# Patient Record
Sex: FEMALE | Race: WHITE | NOT HISPANIC OR LATINO | Employment: FULL TIME | URBAN - METROPOLITAN AREA
[De-identification: names, ages, dates, MRNs, and addresses within clinical notes are randomized per-mention and may not be internally consistent; named-entity substitution may affect disease eponyms.]

---

## 2017-01-04 ENCOUNTER — HOSPITAL ENCOUNTER (OUTPATIENT)
Facility: HOSPITAL | Age: 27
Setting detail: OBSERVATION
Discharge: HOME | End: 2017-01-05
Attending: OBSTETRICS & GYNECOLOGY | Admitting: OBSTETRICS & GYNECOLOGY

## 2017-01-04 ENCOUNTER — LAB REQUISITION (OUTPATIENT)
Dept: LAB | Facility: HOSPITAL | Age: 27
End: 2017-01-04

## 2017-01-04 DIAGNOSIS — O60.03 PRETERM LABOR IN THIRD TRIMESTER WITHOUT DELIVERY: Primary | ICD-10-CM

## 2017-01-04 DIAGNOSIS — O60.03 PRETERM LABOR IN THIRD TRIMESTER WITHOUT DELIVERY: ICD-10-CM

## 2017-01-04 DIAGNOSIS — Z34.83 ENCOUNTER FOR SUPERVISION OF OTHER NORMAL PREGNANCY, THIRD TRIMESTER: ICD-10-CM

## 2017-01-04 DIAGNOSIS — Z36.9 ENCOUNTER FOR ANTENATAL SCREENING OF MOTHER: ICD-10-CM

## 2017-01-04 PROBLEM — Z3A.35 35 WEEKS GESTATION OF PREGNANCY: Status: ACTIVE | Noted: 2017-01-04

## 2017-01-04 PROBLEM — O24.419 GESTATIONAL DIABETES MELLITUS (GDM) IN THIRD TRIMESTER: Status: ACTIVE | Noted: 2017-01-04

## 2017-01-04 LAB
ABO GROUP BLD: NORMAL
ALBUMIN SERPL-MCNC: 3.8 G/DL (ref 3.2–4.8)
ALBUMIN/GLOB SERPL: 1.3 G/DL (ref 1.5–2.5)
ALP SERPL-CCNC: 152 U/L (ref 25–100)
ALT SERPL W P-5'-P-CCNC: 14 U/L (ref 7–40)
AMPHET+METHAMPHET UR QL: NEGATIVE
AMPHETAMINES UR QL: NEGATIVE
ANION GAP SERPL CALCULATED.3IONS-SCNC: 9 MMOL/L (ref 3–11)
AST SERPL-CCNC: 18 U/L (ref 0–33)
BACTERIA UR QL AUTO: ABNORMAL /HPF
BARBITURATES UR QL SCN: NEGATIVE
BASOPHILS # BLD AUTO: 0.02 10*3/MM3 (ref 0–0.2)
BASOPHILS NFR BLD AUTO: 0.1 % (ref 0–1)
BENZODIAZ UR QL SCN: NEGATIVE
BILIRUB SERPL-MCNC: 0.2 MG/DL (ref 0.3–1.2)
BILIRUB UR QL STRIP: NEGATIVE
BLD GP AB SCN SERPL QL: NEGATIVE
BUN BLD-MCNC: 6 MG/DL (ref 9–23)
BUN/CREAT SERPL: 10 (ref 7–25)
BUPRENORPHINE SERPL-MCNC: NEGATIVE NG/ML
CALCIUM SPEC-SCNC: 9.5 MG/DL (ref 8.7–10.4)
CANNABINOIDS SERPL QL: NEGATIVE
CHLORIDE SERPL-SCNC: 105 MMOL/L (ref 99–109)
CLARITY UR: ABNORMAL
CO2 SERPL-SCNC: 23 MMOL/L (ref 20–31)
COCAINE UR QL: NEGATIVE
COLOR UR: YELLOW
CREAT BLD-MCNC: 0.6 MG/DL (ref 0.6–1.3)
DEPRECATED RDW RBC AUTO: 43.2 FL (ref 37–54)
EOSINOPHIL # BLD AUTO: 0.26 10*3/MM3 (ref 0.1–0.3)
EOSINOPHIL NFR BLD AUTO: 1.4 % (ref 0–3)
ERYTHROCYTE [DISTWIDTH] IN BLOOD BY AUTOMATED COUNT: 13.5 % (ref 11.3–14.5)
GFR SERPL CREATININE-BSD FRML MDRD: 122 ML/MIN/1.73
GLOBULIN UR ELPH-MCNC: 3 GM/DL
GLUCOSE BLD-MCNC: 123 MG/DL (ref 70–100)
GLUCOSE UR STRIP-MCNC: ABNORMAL MG/DL
HCT VFR BLD AUTO: 34.9 % (ref 34.5–44)
HGB BLD-MCNC: 12 G/DL (ref 11.5–15.5)
HGB UR QL STRIP.AUTO: NEGATIVE
HYALINE CASTS UR QL AUTO: ABNORMAL /LPF
IMM GRANULOCYTES # BLD: 0.14 10*3/MM3 (ref 0–0.03)
IMM GRANULOCYTES NFR BLD: 0.8 % (ref 0–0.6)
KETONES UR QL STRIP: NEGATIVE
LEUKOCYTE ESTERASE UR QL STRIP.AUTO: ABNORMAL
LYMPHOCYTES # BLD AUTO: 2.33 10*3/MM3 (ref 0.6–4.8)
LYMPHOCYTES NFR BLD AUTO: 12.8 % (ref 24–44)
MCH RBC QN AUTO: 30.2 PG (ref 27–31)
MCHC RBC AUTO-ENTMCNC: 34.4 G/DL (ref 32–36)
MCV RBC AUTO: 87.9 FL (ref 80–99)
METHADONE UR QL SCN: NEGATIVE
MONOCYTES # BLD AUTO: 1.19 10*3/MM3 (ref 0–1)
MONOCYTES NFR BLD AUTO: 6.5 % (ref 0–12)
NEUTROPHILS # BLD AUTO: 14.25 10*3/MM3 (ref 1.5–8.3)
NEUTROPHILS NFR BLD AUTO: 78.4 % (ref 41–71)
NITRITE UR QL STRIP: NEGATIVE
OPIATES UR QL: NEGATIVE
OXYCODONE UR QL SCN: POSITIVE
PCP UR QL SCN: NEGATIVE
PH UR STRIP.AUTO: 6 [PH] (ref 5–8)
PLATELET # BLD AUTO: 300 10*3/MM3 (ref 150–450)
PMV BLD AUTO: 9.9 FL (ref 6–12)
POTASSIUM BLD-SCNC: 3.8 MMOL/L (ref 3.5–5.5)
PROPOXYPH UR QL: NEGATIVE
PROT SERPL-MCNC: 6.8 G/DL (ref 5.7–8.2)
PROT UR QL STRIP: NEGATIVE
RBC # BLD AUTO: 3.97 10*6/MM3 (ref 3.89–5.14)
RBC # UR: ABNORMAL /HPF
REF LAB TEST METHOD: ABNORMAL
RH BLD: POSITIVE
SODIUM BLD-SCNC: 137 MMOL/L (ref 132–146)
SP GR UR STRIP: 1.02 (ref 1–1.03)
SQUAMOUS #/AREA URNS HPF: ABNORMAL /HPF
TRICYCLICS UR QL SCN: NEGATIVE
UROBILINOGEN UR QL STRIP: ABNORMAL
WBC NRBC COR # BLD: 18.19 10*3/MM3 (ref 3.5–10.8)
WBC UR QL AUTO: ABNORMAL /HPF

## 2017-01-04 PROCEDURE — 96376 TX/PRO/DX INJ SAME DRUG ADON: CPT

## 2017-01-04 PROCEDURE — 80053 COMPREHEN METABOLIC PANEL: CPT | Performed by: NURSE PRACTITIONER

## 2017-01-04 PROCEDURE — G0378 HOSPITAL OBSERVATION PER HR: HCPCS

## 2017-01-04 PROCEDURE — 96372 THER/PROPH/DIAG INJ SC/IM: CPT

## 2017-01-04 PROCEDURE — 87081 CULTURE SCREEN ONLY: CPT | Performed by: OBSTETRICS & GYNECOLOGY

## 2017-01-04 PROCEDURE — 59025 FETAL NON-STRESS TEST: CPT

## 2017-01-04 PROCEDURE — 86901 BLOOD TYPING SEROLOGIC RH(D): CPT

## 2017-01-04 PROCEDURE — G0480 DRUG TEST DEF 1-7 CLASSES: HCPCS | Performed by: NURSE PRACTITIONER

## 2017-01-04 PROCEDURE — 25010000002 PENICILLIN G POTASSIUM PER 600000 UNITS: Performed by: OBSTETRICS & GYNECOLOGY

## 2017-01-04 PROCEDURE — 80306 DRUG TEST PRSMV INSTRMNT: CPT | Performed by: NURSE PRACTITIONER

## 2017-01-04 PROCEDURE — 86900 BLOOD TYPING SEROLOGIC ABO: CPT

## 2017-01-04 PROCEDURE — 96361 HYDRATE IV INFUSION ADD-ON: CPT

## 2017-01-04 PROCEDURE — 25010000002 PROMETHAZINE PER 50 MG: Performed by: OBSTETRICS & GYNECOLOGY

## 2017-01-04 PROCEDURE — 25010000002 MORPHINE (PF) 10 MG/ML SOLUTION: Performed by: OBSTETRICS & GYNECOLOGY

## 2017-01-04 PROCEDURE — 96365 THER/PROPH/DIAG IV INF INIT: CPT

## 2017-01-04 PROCEDURE — 81001 URINALYSIS AUTO W/SCOPE: CPT | Performed by: NURSE PRACTITIONER

## 2017-01-04 PROCEDURE — 87086 URINE CULTURE/COLONY COUNT: CPT | Performed by: NURSE PRACTITIONER

## 2017-01-04 PROCEDURE — 25010000002 BUTORPHANOL PER 1 MG: Performed by: OBSTETRICS & GYNECOLOGY

## 2017-01-04 PROCEDURE — 87147 CULTURE TYPE IMMUNOLOGIC: CPT | Performed by: OBSTETRICS & GYNECOLOGY

## 2017-01-04 PROCEDURE — 85025 COMPLETE CBC W/AUTO DIFF WBC: CPT | Performed by: NURSE PRACTITIONER

## 2017-01-04 PROCEDURE — 86850 RBC ANTIBODY SCREEN: CPT

## 2017-01-04 RX ORDER — SODIUM CHLORIDE 0.9 % (FLUSH) 0.9 %
1-10 SYRINGE (ML) INJECTION AS NEEDED
Status: DISCONTINUED | OUTPATIENT
Start: 2017-01-04 | End: 2017-01-05 | Stop reason: HOSPADM

## 2017-01-04 RX ORDER — PROMETHAZINE HYDROCHLORIDE 25 MG/ML
12.5 INJECTION, SOLUTION INTRAMUSCULAR; INTRAVENOUS EVERY 6 HOURS PRN
Status: DISCONTINUED | OUTPATIENT
Start: 2017-01-04 | End: 2017-01-05 | Stop reason: HOSPADM

## 2017-01-04 RX ORDER — LIDOCAINE HYDROCHLORIDE 10 MG/ML
5 INJECTION, SOLUTION INFILTRATION; PERINEURAL AS NEEDED
Status: DISCONTINUED | OUTPATIENT
Start: 2017-01-04 | End: 2017-01-05 | Stop reason: HOSPADM

## 2017-01-04 RX ORDER — SODIUM CHLORIDE, SODIUM LACTATE, POTASSIUM CHLORIDE, CALCIUM CHLORIDE 600; 310; 30; 20 MG/100ML; MG/100ML; MG/100ML; MG/100ML
125 INJECTION, SOLUTION INTRAVENOUS CONTINUOUS
Status: DISCONTINUED | OUTPATIENT
Start: 2017-01-04 | End: 2017-01-05 | Stop reason: HOSPADM

## 2017-01-04 RX ORDER — MORPHINE SULFATE 10 MG/ML
15 INJECTION, SOLUTION INTRAMUSCULAR; INTRAVENOUS EVERY 4 HOURS PRN
Status: COMPLETED | OUTPATIENT
Start: 2017-01-04 | End: 2017-01-04

## 2017-01-04 RX ORDER — LIDOCAINE HYDROCHLORIDE 10 MG/ML
5 INJECTION, SOLUTION INFILTRATION; PERINEURAL AS NEEDED
Status: CANCELLED | OUTPATIENT
Start: 2017-01-04

## 2017-01-04 RX ORDER — SODIUM CHLORIDE 0.9 % (FLUSH) 0.9 %
1-10 SYRINGE (ML) INJECTION AS NEEDED
Status: CANCELLED | OUTPATIENT
Start: 2017-01-04

## 2017-01-04 RX ORDER — SODIUM CHLORIDE, SODIUM LACTATE, POTASSIUM CHLORIDE, CALCIUM CHLORIDE 600; 310; 30; 20 MG/100ML; MG/100ML; MG/100ML; MG/100ML
125 INJECTION, SOLUTION INTRAVENOUS CONTINUOUS
Status: CANCELLED | OUTPATIENT
Start: 2017-01-04

## 2017-01-04 RX ORDER — HYDROXYZINE PAMOATE 25 MG/1
25 CAPSULE ORAL NIGHTLY PRN
COMMUNITY
End: 2017-06-01

## 2017-01-04 RX ORDER — ACETAMINOPHEN 325 MG/1
650 TABLET ORAL EVERY 4 HOURS PRN
Status: CANCELLED | OUTPATIENT
Start: 2017-01-04

## 2017-01-04 RX ORDER — ACETAMINOPHEN 325 MG/1
650 TABLET ORAL EVERY 4 HOURS PRN
Status: DISCONTINUED | OUTPATIENT
Start: 2017-01-04 | End: 2017-01-05 | Stop reason: HOSPADM

## 2017-01-04 RX ORDER — MORPHINE SULFATE 4 MG/ML
15 INJECTION, SOLUTION INTRAMUSCULAR; INTRAVENOUS EVERY 4 HOURS PRN
Status: DISCONTINUED | OUTPATIENT
Start: 2017-01-04 | End: 2017-01-04 | Stop reason: SDUPTHER

## 2017-01-04 RX ADMIN — SODIUM CHLORIDE, POTASSIUM CHLORIDE, SODIUM LACTATE AND CALCIUM CHLORIDE 125 ML/HR: 600; 310; 30; 20 INJECTION, SOLUTION INTRAVENOUS at 15:05

## 2017-01-04 RX ADMIN — SODIUM CHLORIDE, POTASSIUM CHLORIDE, SODIUM LACTATE AND CALCIUM CHLORIDE 1000 ML: 600; 310; 30; 20 INJECTION, SOLUTION INTRAVENOUS at 14:40

## 2017-01-04 RX ADMIN — SODIUM CHLORIDE 2.5 MILLION UNITS: 9 INJECTION, SOLUTION INTRAVENOUS at 21:55

## 2017-01-04 RX ADMIN — PENICILLIN G POTASSIUM 5 MILLION UNITS: 5000000 POWDER, FOR SOLUTION INTRAMUSCULAR; INTRAPLEURAL; INTRATHECAL; INTRAVENOUS at 15:33

## 2017-01-04 RX ADMIN — BUTORPHANOL TARTRATE 2 MG: 2 INJECTION, SOLUTION INTRAMUSCULAR; INTRAVENOUS at 15:35

## 2017-01-04 RX ADMIN — PROMETHAZINE HYDROCHLORIDE 12.5 MG: 25 INJECTION INTRAMUSCULAR; INTRAVENOUS at 21:45

## 2017-01-04 RX ADMIN — MORPHINE SULFATE 15 MG: 10 INJECTION, SOLUTION INTRAMUSCULAR; INTRAVENOUS at 21:55

## 2017-01-04 NOTE — IP AVS SNAPSHOT
AFTER VISIT SUMMARY             Rachana Balderas           About your hospitalization     You were admitted on:  January 4, 2017 You last received care in the:  [facility] LABOR DELIVERY       Procedures & Surgeries         Medications    If you or your caregiver advised us that you are currently taking a medication and that medication is marked below as “Resume”, this simply indicates that we have reviewed those medications to make sure our new therapy recommendations do not interfere.  If you have concerns about medications other than those new ones which we are prescribing today, please consult the physician who prescribed them (or your primary physician).  Our review of your home medications is not meant to indicate that we are directing their use.             Your Medications      CONTINUE taking these medications     azithromycin 250 MG tablet   Commonly known as:  ZITHROMAX           diphenhydrAMINE 25 mg capsule   Take 1 capsule by mouth Every 6 (Six) Hours As Needed for itching.   Commonly known as:  BENADRYL           ferrous sulfate 325 (65 FE) MG tablet   TAKE 1 TABLET (325 MG) BY ORAL ROUTE ONCE DAILY FOR 30 DAYS           hydrOXYzine 25 MG capsule   Take 25 mg by mouth At Night As Needed for itching.   Commonly known as:  VISTARIL           loratadine 10 MG tablet   Take 1 tablet by mouth Daily.   Commonly known as:  CLARITIN           raNITIdine 150 MG tablet   TAKE 1 TABLET TWICE A DAY   Commonly known as:  ZANTAC                      Your Medications      Your Medication List           Morning Noon Evening Bedtime As Needed    azithromycin 250 MG tablet   Commonly known as:  ZITHROMAX                                diphenhydrAMINE 25 mg capsule   Take 1 capsule by mouth Every 6 (Six) Hours As Needed for itching.   Commonly known as:  BENADRYL                                ferrous sulfate 325 (65 FE) MG tablet   TAKE 1 TABLET (325 MG) BY ORAL ROUTE ONCE DAILY FOR 30 DAYS                                   hydrOXYzine 25 MG capsule   Take 25 mg by mouth At Night As Needed for itching.   Commonly known as:  VISTARIL                                loratadine 10 MG tablet   Take 1 tablet by mouth Daily.   Commonly known as:  CLARITIN                                raNITIdine 150 MG tablet   TAKE 1 TABLET TWICE A DAY   Commonly known as:  ZANTAC                                         Instructions for After Discharge        Discharge References/Attachments      LABOR INFORMATION, EASY-TO-READ (ENGLISH)       Follow-ups for After Discharge        Follow-up Information     Follow up with Navya Hinojosa MD Follow up on 1/10/2017.    Specialties:  Obstetrics and Gynecology, Gynecology    Contact information:    [address] [facility] [address] [place] [address]  [phone]        [employer] Signup     Our records indicate that you have declined [employer] signup. If you would like to sign up for , please email [email or web address] or call [phone] to obtain an activation code.         Summary of Your Hospitalization        Reason for Hospitalization     Your primary diagnosis was:  Not on File    Your diagnoses also included:  35 Weeks Gestation Of Pregnancy,  Labor In Third Trimester, Gestational Diabetes Mellitus (Gdm) In Third Trimester, Threatened Premature Labor      Care Providers     Provider Service Role Specialty    Navya Hinojosa MD -- Attending Provider Obstetrics and Gynecology      Your Allergies  Date Reviewed: 2017    No active allergies      Pending Labs     Order Current Status    Oxycodone / Morphone Confirmation, Urine In process    Group B Strep Culture Preliminary result    Urine Culture Preliminary result      Patient Belongings Returned     Document Return of Belongings Flowsheet     Were the patient bedside belongings sent home?   --   Belongings Retrieved from Security & Sent Home   --    Belongings Sent to Safe   --   Medications  Retrieved from Pharmacy & Sent Home   --              More Information       Labor Information   labor is when labor starts before you are 37 weeks pregnant. The normal length of pregnancy is 39 to 41 weeks.   CAUSES   The cause of  labor is not often known. The most common known cause is infection.  RISK FACTORS  · Having a history of  labor.  · Having your water break before it should.  · Having a placenta that covers the opening of the cervix.  · Having a placenta that breaks away from the uterus.  · Having a cervix that is too weak to hold the baby in the uterus.  · Having too much fluid in the amniotic sac.  · Taking drugs or smoking while pregnant.  · Not gaining enough weight while pregnant.  · Being younger than 18 and older than 35 years old.  · Having a low income.  · Being .  SYMPTOMS  · Period-like cramps, belly (abdominal) pain, or back pain.  · Contractions that are regular, as often as six in an hour. They may be mild or painful.  · Contractions that start at the top of the belly. They then move to the lower belly and back.  · Lower belly pressure that seems to get stronger.  · Bleeding from the vagina.  · Fluid leaking from the vagina.  TREATMENT   Treatment depends on:  · Your condition.  · The condition of your baby.  · How many weeks pregnant you are.  Your doctor may have you:  · Take medicine to stop contractions.  · Stay in bed except to use the restroom (bed rest).  · Stay in the hospital.  WHAT SHOULD YOU DO IF YOU THINK YOU ARE IN  LABOR?  Call your doctor right away. You need to go to the hospital right away.   HOW CAN YOU PREVENT  LABOR IN FUTURE PREGNANCIES?  · Stop smoking, if you smoke.  · Maintain healthy weight gain.  · Do not take drugs or be around chemicals that are not needed.  · Tell your doctor if you think you have an infection.  · Tell your doctor if you had a  labor before.     This information is not intended  to replace advice given to you by your health care provider. Make sure you discuss any questions you have with your health care provider.     Document Released: 03/16/2010 Document Revised: 05/03/2016 Document Reviewed: 01/20/2014  [employer] Interactive Patient Education ©2016 [employer]            SYMPTOMS OF A STROKE    Call 911 or have someone take you to the Emergency Department if you have any of the following:    · Sudden numbness or weakness of your face, arm or leg especially on one side of the body  · Sudden confusion, diffiiculty speaking or trouble understanding   · Changes in your vision or loss of sight in one eye  · Sudden severe headache with no known cause  · sudden dizziness, trouble walking, loss of balance or coordination    It is important to seek emergency care right away if you have further stroke symptoms. If you get emergency help quickly, the powerful clot-dissolving medicines can reduce the disabilities caused by a stroke.     For more information:    American Stroke Association  6-802-5-STROKE  www.strokeassociation.org           IF YOU SMOKE OR USE TOBACCO PLEASE READ THE FOLLOWING:    Why is smoking bad for me?  Smoking increases the risk of heart disease, lung disease, vascular disease, stroke, and cancer.     If you smoke, STOP!    If you would like more information on quitting smoking, please visit the HereOrThere website: [email or web address]   This link will provide additional resources including the QUIT line and the Beat the Pack support groups.     For more information:    American Cancer Society  ([phone] 571-5398    American Heart Association  2-648-598-1837               YOU ARE THE MOST IMPORTANT FACTOR IN YOUR RECOVERY.     Follow all instructions carefully.     I have reviewed my discharge instructions with my nurse, including the following information, if applicable:     Information about my illness and diagnosis   Follow up  appointments (including lab draws)   Wound Care   Equipment Needs   Medications (new and continuing) along with side effects   Preventative information such as vaccines and smoking cessations   Diet   Pain   I know when to contact my Doctor's office or seek emergency care      I want my nurse to describe the side effects of my medications: YES NO   If the answer is no, I understand the side effects of my medications: YES NO   My nurse described the side effects of my medications in a way that I could understand: YES NO   I have taken my personal belongings and my own medications with me at discharge: YES NO            I have received this information and my questions have been answered. I have discussed any concerns I see with this plan with the nurse or physician. I understand these instructions.    Signature of Patient or Responsible Person: _____________________________________    Date: _________________  Time: __________________    Signature of Healthcare Provider: _______________________________________  Date: _________________  Time: __________________

## 2017-01-04 NOTE — LETTER
January 5, 2017     Patient: Rachana Balderas   YOB: 1990   Date of Visit: 1/4/2017       To Whom It May Concern:    It is my medical opinion that Rachana Balderas needs to be off work until further notice.           Sincerely,        LILIBETH Hinojosa  CC: No Recipients

## 2017-01-05 VITALS
HEIGHT: 70 IN | SYSTOLIC BLOOD PRESSURE: 139 MMHG | HEART RATE: 75 BPM | TEMPERATURE: 97.8 F | RESPIRATION RATE: 16 BRPM | WEIGHT: 219 LBS | DIASTOLIC BLOOD PRESSURE: 81 MMHG | BODY MASS INDEX: 31.35 KG/M2

## 2017-01-05 PROBLEM — O24.419 GESTATIONAL DIABETES MELLITUS (GDM) IN THIRD TRIMESTER: Status: RESOLVED | Noted: 2017-01-04 | Resolved: 2017-01-05

## 2017-01-05 PROBLEM — Z3A.35 35 WEEKS GESTATION OF PREGNANCY: Status: RESOLVED | Noted: 2017-01-04 | Resolved: 2017-01-05

## 2017-01-05 PROBLEM — O60.03 PRETERM LABOR IN THIRD TRIMESTER WITHOUT DELIVERY: Status: RESOLVED | Noted: 2017-01-04 | Resolved: 2017-01-05

## 2017-01-05 PROBLEM — O60.03 PRETERM LABOR IN THIRD TRIMESTER: Status: RESOLVED | Noted: 2017-01-04 | Resolved: 2017-01-05

## 2017-01-05 LAB
GLUCOSE BLDC GLUCOMTR-MCNC: 86 MG/DL (ref 70–130)
REF LAB TEST METHOD: NORMAL

## 2017-01-05 PROCEDURE — 25010000002 PENICILLIN G POTASSIUM PER 600000 UNITS: Performed by: OBSTETRICS & GYNECOLOGY

## 2017-01-05 PROCEDURE — 82962 GLUCOSE BLOOD TEST: CPT

## 2017-01-05 PROCEDURE — 96361 HYDRATE IV INFUSION ADD-ON: CPT

## 2017-01-05 PROCEDURE — G0378 HOSPITAL OBSERVATION PER HR: HCPCS

## 2017-01-05 PROCEDURE — 59025 FETAL NON-STRESS TEST: CPT

## 2017-01-05 RX ADMIN — SODIUM CHLORIDE 2.5 MILLION UNITS: 9 INJECTION, SOLUTION INTRAVENOUS at 02:22

## 2017-01-05 NOTE — DISCHARGE SUMMARY
DARELL DUQUE  Patient Name: Rachana Balderas  : 1990  MRN: [id number]  Date of Service: 2017  Referring Provider: Navya Hinojosa     ID: 26 y.o.  at 36w0d    Admission Diagnosis:  labor in third trimester without delivery [O60.03]    Discharge Diagnosis:   Patient Active Problem List   Diagnosis   (none) - all problems resolved or deleted       Date of Admission: 2017  1:49 PM    Date of Discharge: 2017    Discharge Condition: Stable    Discharge to: Home    Discharge Medications:    Rachana Balderas   Home Medication Instructions JOSEF:[id number]    Printed on:17 0827   Medication Information                      azithromycin (ZITHROMAX) 250 MG tablet               diphenhydrAMINE (BENADRYL) 25 mg capsule  Take 1 capsule by mouth Every 6 (Six) Hours As Needed for itching.             ferrous sulfate 325 (65 FE) MG tablet  TAKE 1 TABLET (325 MG) BY ORAL ROUTE ONCE DAILY FOR 30 DAYS             hydrOXYzine (VISTARIL) 25 MG capsule  Take 25 mg by mouth At Night As Needed for itching.             loratadine (CLARITIN) 10 MG tablet  Take 1 tablet by mouth Daily.             raNITIdine (ZANTAC) 150 MG tablet  TAKE 1 TABLET TWICE A DAY                 Discharge Diet:     Discharge Activity:    Follow up appointments:     Hospital summary:      Rachana was admitted for <principal problem not specified>.    She did not receive a course of  corticosteroids during her admission.      She did not receive magnesium sulfate during her admission.      Her obstetric ultrasounds and fetal testing were reassuring during her admission.  Her condition was determined to be appropriate for outpatient management and she was discharged home in stable condition.  She was instructed to follow up in 1 week with [unfilled] in  1 week.    Navya Hinojosa MD  8:27 AM

## 2017-01-05 NOTE — PROGRESS NOTES
cx remains 5/90/-1 with intact membranes overnite with no change. Baby looks reactive with nl BP so will send home as there is no real labor and she is 36 weeks

## 2017-01-05 NOTE — H&P
"History and Physical  [facility]    Chief Complaint   Patient presents with   • Laboring       Patient Active Problem List   Diagnosis   • Rash and nonspecific skin eruption   • 35 weeks gestation of pregnancy   •  labor in third trimester   • Gestational diabetes mellitus (GDM) in third trimester       Rachana Balderas is a 26 y.o. year old  with an Estimated Date of Delivery: 17 currently at 35w6d presenting with irregular contractions and was sent from [place] office in probable labor.  No bleeding or SROM. .    Prenatal care has been with Dr. Hinojosa.  It has been significant for diabetes (GDM A1).    No Additional Complaints Reported    The following portions of the patient's history were reviewed and updated as appropriate:vital signs, allergies, current medications, past medical history, past social history, past surgical history and problem list.    Review of Systems  Pertinent items are noted in HPI.     Objective     Visit Vitals   • /84 (BP Location: Left arm, Patient Position: Sitting)   • Pulse 109   • Temp 97.8 °F (36.6 °C) (Oral)   • Resp 18   • Ht 70\" (177.8 cm)   • Wt 219 lb (99.3 kg)   • LMP 04/15/2016 (Approximate)   • Breastfeeding No   • BMI 31.42 kg/m2       Physical Exam    General:  well developed; well nourished  no acute distress           Abdomen: soft, non-tender; no masses  no umbilical or inginual hernias are present  fundus firm and non-tender       FHT's: reactive and category 1   Cervix: 4-5 cm dilated, 90 effaced, 0 station   [place]: Contraction are now q 15 min     Lab Review   Labs: CBC   Lab Results (last 24 hours)     Procedure Component Value Units Date/Time    Urine Culture [[id number]] Collected:  17 1400    Specimen:  Urine from Urine, Clean Catch Updated:  17 1503    CBC & Differential [[id number]] Collected:  17 1440    Specimen:  Blood Updated:  17 1505    Narrative:       The following orders were created for panel " order CBC & Differential.  Procedure                               Abnormality         Status                     ---------                               -----------         ------                     CBC Auto Differential[[id number]]         Abnormal            Final result                 Please view results for these tests on the individual orders.    CBC Auto Differential [[id number]]  (Abnormal) Collected:  01/04/17 1440    Specimen:  Blood Updated:  01/04/17 1505     WBC 18.19 (H) 10*3/mm3      RBC 3.97 10*6/mm3      Hemoglobin 12.0 g/dL      Hematocrit 34.9 %      MCV 87.9 fL      MCH 30.2 pg      MCHC 34.4 g/dL      RDW 13.5 %      RDW-SD 43.2 fl      MPV 9.9 fL      Platelets 300 10*3/mm3      Neutrophil % 78.4 (H) %      Lymphocyte % 12.8 (L) %      Monocyte % 6.5 %      Eosinophil % 1.4 %      Basophil % 0.1 %      Immature Grans % 0.8 (H) %      Neutrophils, Absolute 14.25 (H) 10*3/mm3      Lymphocytes, Absolute 2.33 10*3/mm3      Monocytes, Absolute 1.19 (H) 10*3/mm3      Eosinophils, Absolute 0.26 10*3/mm3      Basophils, Absolute 0.02 10*3/mm3      Immature Grans, Absolute 0.14 (H) 10*3/mm3     Oxycodone / Morphone Confirmation, Urine [[id number]] Collected:  01/04/17 1400    Specimen:  Urine from Urine, Clean Catch Updated:  01/04/17 1512    Urinalysis With / Culture If Indicated [[id number]]  (Abnormal) Collected:  01/04/17 1400    Specimen:  Urine Updated:  01/04/17 1526     Color, UA Yellow      Appearance, UA Turbid (A)      pH, UA 6.0      Specific Gravity, UA 1.019      Glucose,  mg/dL (2+) (A)      Ketones, UA Negative      Bilirubin, UA Negative      Blood, UA Negative      Protein, UA Negative      Leuk Esterase, UA Moderate (2+) (A)      Nitrite, UA Negative      Urobilinogen, UA 0.2 E.U./dL     Urine Drug Screen [[id number]]  (Abnormal) Collected:  01/04/17 1400    Specimen:  Urine Updated:  01/04/17 1526     THC, Screen, Urine Negative      Phencyclidine (PCP), Urine Negative      Cocaine  Screen, Urine Negative      Methamphetamine, Urine Negative      Opiate Screen Negative      Amphetamine Screen, Urine Negative      Benzodiazepine Screen, Urine Negative      Tricyclic Antidepressants Screen Negative      Methadone Screen, Urine Negative      Barbiturates Screen, Urine Negative      Oxycodone Screen, Urine Positive (A)      Propoxyphene Screen Negative      Buprenorphine, Screen, Urine Negative     Narrative:       Cutoff For Drugs Screened:    Amphetamines               500 ng/ml  Barbiturates               200 ng/ml  Benzodiazepines            150 ng/ml  Cocaine                    150 ng/ml  Methadone                  200 ng/ml  Opiates                    100 ng/ml  Phencyclidine               25 ng/ml  THC                            50 ng/ml  Methamphetamine            500 ng/ml  Tricyclic Antidepressants  300 ng/ml  Oxycodone                  100 ng/ml  Propoxyphene               300 ng/ml  Buprenorphine               10 ng/ml    The normal value for all drugs tested is negative. This report includes unconfirmed screening results, with the cutoff values listed, to be used for medical treatment purposes only.  Unconfirmed results must not be used for non-medical purposes such as employment or legal testing.  Clinical consideration should be applied to any drug of abuse test, particularly when unconfirmed results are used.      Urinalysis, Microscopic Only [[id number]]  (Abnormal) Collected:  01/04/17 1400    Specimen:  Urine from Urine, Clean Catch Updated:  01/04/17 1526     RBC, UA 0-2 /HPF      WBC, UA 13-20 (A) /HPF      Bacteria, UA None Seen /HPF      Squamous Epithelial Cells, UA 3-6 (A) /HPF      Hyaline Casts, UA 0-6 /LPF      Methodology Automated Microscopy     Comprehensive Metabolic Panel [[id number]]  (Abnormal) Collected:  01/04/17 1440    Specimen:  Blood Updated:  01/04/17 1537     Glucose 123 (H) mg/dL      BUN 6 (L) mg/dL      Creatinine 0.60 mg/dL      Sodium 137 mmol/L       Potassium 3.8 mmol/L      Chloride 105 mmol/L      CO2 23.0 mmol/L      Calcium 9.5 mg/dL      Total Protein 6.8 g/dL      Albumin 3.80 g/dL      ALT (SGPT) 14 U/L      AST (SGOT) 18 U/L      Alkaline Phosphatase 152 (H) U/L      Total Bilirubin 0.2 (L) mg/dL      eGFR Non African Amer 122 mL/min/1.73      Globulin 3.0 gm/dL      A/G Ratio 1.3 (L) g/dL      BUN/Creatinine Ratio 10.0      Anion Gap 9.0 mmol/L     Narrative:       National Kidney Foundation Guidelines    Stage                           Description                             GFR                      1                               Normal or High                          90+  2                               Mild decrease                            60-89  3                               Moderate decrease                   30-59  4                               Severe decrease                       15-29  5                               Kidney failure                             <15    Group B Strep Culture [[id number]] Collected:  17 1445    Specimen:  Swab from Vagina Updated:  17 4654          Imaging   No data reviewed   Imaging Results (most recent)     None        Assessment/Plan     ASSESSMENT  1. IUP at 35w6d  Active Problems:    35 weeks gestation of pregnancy     labor in third trimester    Gestational diabetes mellitus (GDM) in third trimester              7 .  Possible UTI; borderline CCUA.  C&S sent.               8.   Advanced cervical dilation. Contractions now spaced out.               9.    H/o positive UDS for Oxycodone.   PLAN     Observe for further dilation.  Too early for augmentation or AROM unless making cervical change.   Too late gestation for administration of steroids.   Watch FSBGs; late evaluation.  Check Hgb Aic.   Oliver Champion MD  20177:26 PM

## 2017-01-06 LAB — BACTERIA SPEC AEROBE CULT: NORMAL

## 2017-01-08 LAB
BACTERIA SPEC AEROBE CULT: ABNORMAL
STREP GROUPING: ABNORMAL

## 2017-06-01 ENCOUNTER — OFFICE VISIT (OUTPATIENT)
Dept: FAMILY MEDICINE CLINIC | Facility: CLINIC | Age: 27
End: 2017-06-01

## 2017-06-01 VITALS
SYSTOLIC BLOOD PRESSURE: 150 MMHG | HEIGHT: 70 IN | RESPIRATION RATE: 16 BRPM | DIASTOLIC BLOOD PRESSURE: 85 MMHG | TEMPERATURE: 98.3 F | HEART RATE: 76 BPM | WEIGHT: 218.2 LBS | BODY MASS INDEX: 31.24 KG/M2

## 2017-06-01 DIAGNOSIS — G56.03 BILATERAL CARPAL TUNNEL SYNDROME: Primary | ICD-10-CM

## 2017-06-01 PROCEDURE — 99213 OFFICE O/P EST LOW 20 MIN: CPT | Performed by: FAMILY MEDICINE

## 2017-06-01 RX ORDER — BUPRENORPHINE HYDROCHLORIDE AND NALOXONE HYDROCHLORIDE DIHYDRATE 8; 2 MG/1; MG/1
TABLET SUBLINGUAL
COMMUNITY
Start: 2017-05-25

## 2017-06-01 RX ORDER — PREDNISONE 20 MG/1
20 TABLET ORAL 2 TIMES DAILY
Qty: 10 TABLET | Refills: 0 | Status: SHIPPED | OUTPATIENT
Start: 2017-06-01 | End: 2017-06-06

## 2017-06-01 NOTE — PATIENT INSTRUCTIONS
Go to the nearest ER or return to clinic if symptoms worsen, fever/chill develop      Carpal Tunnel Syndrome  Carpal tunnel syndrome is a condition that causes pain in your hand and arm. The carpal tunnel is a narrow area that is on the palm side of your wrist. Repeated wrist motion or certain diseases may cause swelling in the tunnel. This swelling can pinch the main nerve in the wrist (median nerve).   HOME CARE  If You Have a Splint:  · Wear it as told by your doctor. Remove it only as told by your doctor.  · Loosen the splint if your fingers:  ¨ Become numb and tingle.  ¨ Turn blue and cold.  · Keep the splint clean and dry.  General Instructions   · Take over-the-counter and prescription medicines only as told by your doctor.  · Rest your wrist from any activity that may be causing your pain. If needed, talk to your employer about changes that can be made in your work, such as getting a wrist pad to use while typing.  · If directed, apply ice to the painful area:    Put ice in a plastic bag.    Place a towel between your skin and the bag.    Leave the ice on for 20 minutes, 2-3 times per day.  · Keep all follow-up visits as told by your doctor. This is important.  · Do any exercises as told by your doctor, physical therapist, or occupational therapist.  GET HELP IF:  · You have new symptoms.  · Medicine does not help your pain.  · Your symptoms get worse.     This information is not intended to replace advice given to you by your health care provider. Make sure you discuss any questions you have with your health care provider.     Document Released: 12/06/2012 Document Revised: 09/07/2016 Document Reviewed: 05/04/2016  [employer] Interactive Patient Education ©2017 [employer]

## 2017-06-01 NOTE — PROGRESS NOTES
Subjective   Rachana Balderas is a 27 y.o. female.     History of Present Illness   Here for evaluation of bilateral wrist pain  States that it has been present for months, but getting worse  She treats with Ibuprofen, however still wakes her up at night with numbness.    Symptoms are worse in her right hand.  She experiences numbness, tingling, pain that starts in her 2-4 digits and radiate up her forearm.   Treating with ibuprofen 600mg x 1 month to improve symptoms, not improving.   Denies neck pain, trauma, or radiation of symptoms from her neck into her fingers.    The following portions of the patient's history were reviewed and updated as appropriate: allergies, current medications, past family history, past medical history, past social history, past surgical history and problem list.    Review of Systems   Respiratory: Negative.    Cardiovascular: Negative.    Musculoskeletal: Positive for arthralgias (wrist pain b/l). Negative for joint swelling, neck pain and neck stiffness.   Neurological: Positive for weakness (hands) and numbness. Negative for tremors.       Objective   Physical Exam   Constitutional: She is oriented to person, place, and time. She appears well-developed and well-nourished.   HENT:   Head: Normocephalic and atraumatic.   Right Ear: External ear normal.   Left Ear: External ear normal.   Nose: Nose normal.   Eyes: Conjunctivae and EOM are normal.   Neck: Normal range of motion.   Cardiovascular: Normal rate, regular rhythm and normal heart sounds.    No murmur heard.  Pulmonary/Chest: Effort normal and breath sounds normal.   Musculoskeletal: She exhibits no edema or deformity.   +phanel test b/l, +tinel in the right UE   Neurological: She is alert and oriented to person, place, and time. No cranial nerve deficit.   Skin: Skin is warm and dry.   Psychiatric: She has a normal mood and affect. Her behavior is normal.   Nursing note and vitals reviewed.      Assessment/Plan   Rachana was seen  today for bilateral wrist pain, r > l.    Diagnoses and all orders for this visit:    Bilateral carpal tunnel syndrome  -     Elastic Bandages & Supports (WRIST SPLINT) misc; Wear on wrists as needed to improve symptoms of carpal tunnel  -     predniSONE (DELTASONE) 20 MG tablet; Take 1 tablet by mouth 2 (Two) Times a Day for 5 days.      Treat with wrist splint and prednisone  If symptoms persist, will refer for nerve conduction study and to hand surgeon.

## 2017-07-28 ENCOUNTER — OFFICE VISIT (OUTPATIENT)
Dept: FAMILY MEDICINE CLINIC | Facility: CLINIC | Age: 27
End: 2017-07-28

## 2017-07-28 VITALS
RESPIRATION RATE: 16 BRPM | WEIGHT: 210.4 LBS | BODY MASS INDEX: 30.12 KG/M2 | DIASTOLIC BLOOD PRESSURE: 84 MMHG | TEMPERATURE: 98.3 F | SYSTOLIC BLOOD PRESSURE: 140 MMHG | HEIGHT: 70 IN | HEART RATE: 80 BPM

## 2017-07-28 DIAGNOSIS — J06.9 VIRAL UPPER RESPIRATORY TRACT INFECTION: ICD-10-CM

## 2017-07-28 DIAGNOSIS — J02.9 SORE THROAT: Primary | ICD-10-CM

## 2017-07-28 LAB
EXPIRATION DATE: NORMAL
INTERNAL CONTROL: NORMAL
Lab: NORMAL
S PYO AG THROAT QL: NEGATIVE

## 2017-07-28 PROCEDURE — 99213 OFFICE O/P EST LOW 20 MIN: CPT | Performed by: NURSE PRACTITIONER

## 2017-07-28 PROCEDURE — 87880 STREP A ASSAY W/OPTIC: CPT | Performed by: NURSE PRACTITIONER

## 2017-07-28 RX ORDER — AMOXICILLIN 400 MG/5ML
400 POWDER, FOR SUSPENSION ORAL 3 TIMES DAILY
Qty: 150 ML | Refills: 0 | Status: SHIPPED | OUTPATIENT
Start: 2017-07-28

## 2017-07-28 NOTE — PROGRESS NOTES
Subjective   Rachana Balderas is a 27 y.o. female.     History of Present Illness   ST cough and congestion for the past 2 days  Tonsil stones in large amt, very odiferous  Pt is 2 months pregnant  No fever or chills, no HA or N/V  Taking Ibuprofen for ST    The following portions of the patient's history were reviewed and updated as appropriate: allergies, current medications, past family history, past medical history, past social history, past surgical history and problem list.    Review of Systems   Constitutional: Negative.  Negative for chills and fever.   HENT: Positive for congestion and sore throat. Negative for rhinorrhea, sinus pressure and sneezing.    Eyes: Negative.    Respiratory: Positive for cough. Negative for chest tightness and wheezing.    Cardiovascular: Negative.    Gastrointestinal: Negative for nausea and vomiting.   Endocrine: Negative.    Genitourinary: Negative.    Musculoskeletal: Negative.    Skin: Negative.    Allergic/Immunologic: Negative.    Neurological: Negative.    Hematological: Negative.  Negative for adenopathy.   Psychiatric/Behavioral: Negative.        Objective   Physical Exam   Constitutional: She is oriented to person, place, and time. She appears well-developed and well-nourished. No distress.   HENT:   Head: Normocephalic.   Right Ear: Tympanic membrane, external ear and ear canal normal.   Left Ear: Tympanic membrane, external ear and ear canal normal.   Nose: Nose normal. No mucosal edema or rhinorrhea.   Mouth/Throat: Uvula is midline. Posterior oropharyngeal erythema present. No posterior oropharyngeal edema or tonsillar abscesses.   Neck: Neck supple.   Cardiovascular: Normal rate, regular rhythm, S1 normal, S2 normal and normal heart sounds.    Pulmonary/Chest: Effort normal and breath sounds normal.   Lymphadenopathy:        Head (right side): No tonsillar, no preauricular, no posterior auricular and no occipital adenopathy present.        Head (left side): No  tonsillar, no preauricular, no posterior auricular and no occipital adenopathy present.   Neurological: She is alert and oriented to person, place, and time.   Skin: Skin is warm, dry and intact.   Psychiatric: She has a normal mood and affect. Her speech is normal.   Nursing note and vitals reviewed.      Assessment/Plan   Rachana was seen today for tonsil stones, cough & chest congestion.    Diagnoses and all orders for this visit:    Sore throat  -     POCT rapid strep A    Viral upper respiratory tract infection    Other orders  -     amoxicillin (AMOXIL) 400 MG/5ML suspension; Take 5 mL by mouth 3 (Three) Times a Day.      Strep negative, pt informed  Will treat with abx due to pt physical exam suggestive of infection.   Take Tylenol for ST and use warm saline gargles. Avoid Ibuprofen or aspirin during pregnancy.  F/U if not improving